# Patient Record
Sex: MALE | Race: ASIAN | NOT HISPANIC OR LATINO
[De-identification: names, ages, dates, MRNs, and addresses within clinical notes are randomized per-mention and may not be internally consistent; named-entity substitution may affect disease eponyms.]

---

## 2022-10-29 ENCOUNTER — NON-APPOINTMENT (OUTPATIENT)
Age: 42
End: 2022-10-29

## 2022-11-14 ENCOUNTER — APPOINTMENT (OUTPATIENT)
Dept: OTOLARYNGOLOGY | Facility: CLINIC | Age: 42
End: 2022-11-14

## 2022-11-14 VITALS
DIASTOLIC BLOOD PRESSURE: 82 MMHG | WEIGHT: 114.64 LBS | BODY MASS INDEX: 18.42 KG/M2 | HEIGHT: 66.14 IN | HEART RATE: 61 BPM | SYSTOLIC BLOOD PRESSURE: 124 MMHG

## 2022-11-14 DIAGNOSIS — H93.293 OTHER ABNORMAL AUDITORY PERCEPTIONS, BILATERAL: ICD-10-CM

## 2022-11-14 DIAGNOSIS — F17.210 NICOTINE DEPENDENCE, CIGARETTES, UNCOMPLICATED: ICD-10-CM

## 2022-11-14 PROBLEM — Z00.00 ENCOUNTER FOR PREVENTIVE HEALTH EXAMINATION: Status: ACTIVE | Noted: 2022-11-14

## 2022-11-14 PROCEDURE — 99203 OFFICE O/P NEW LOW 30 MIN: CPT

## 2022-11-14 RX ORDER — MUPIROCIN 20 MG/G
2 OINTMENT TOPICAL
Qty: 22 | Refills: 0 | Status: DISCONTINUED | COMMUNITY
Start: 2022-09-27

## 2022-11-20 PROBLEM — H93.293 OTHER ABNORMAL AUDITORY PERCEPTIONS, BILATERAL: Status: ACTIVE | Noted: 2022-11-20

## 2022-11-20 NOTE — PHYSICAL EXAM
[Binocular Microscopic Exam] : Binocular microscopic exam was performed [Normal] : mucosa is normal [Midline] : trachea located in midline position [Hearing Loss Right Only] : diminished [Hearing Loss Left Only] : diminished [de-identified] : 60% TM perforation

## 2022-11-20 NOTE — HISTORY OF PRESENT ILLNESS
[de-identified] : 42 year old male presents for initial evaluation for right TM perforation since he was kid\par Patient states over the past several years he believes it is getting worse because he has increase sensitivity to loud noises and cold air.\par Reports decreased hearing in the right and constant right tinnitus. \par Denies otalgia, otorrhea, recent fevers or ear infections, dizziness, vertigo, headaches related to hearing. \par

## 2022-11-20 NOTE — DATA REVIEWED
[de-identified] : An audiogram was ordered and performed including pure tones, tympanometry and speech testing for the patients complaint of hearing loss\par I have independently reviewed the patient's audiogram from today and my findings include 
previous myomectomy

## 2022-11-20 NOTE — ASSESSMENT
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 Physical exam shows Right 60% TM perforation, Normal EAC. Left Normal EAC/TM\par \par Recommend:\par SNHL\par -Discussed Benefit of Hearings Aids and their value of helping keep brain stimulated by helping hear conversation which keeps a person active and socially connected. Stressed also the association with a lower risk of incident dementia and slower cognitive decline.\par -Clearance Hearing Aid Evaluation Given\par -Patient interested in hearing aids after TM repair\par -Patient had recent audiogram on 10/25/22 instructed to bring that in when he see Dr. Damon\par \par Perforated TM\par - Educated patient on keeping ears as dry as possible, Avoid sticking anything into the ear canal (i.e. q-tips, hairpins to clean ears), Avoid swimming in polluted hines, To keep ears dry patient can use hair dryer to blow warm air back and forth over the ear canal.\par - Will allow for TM chance to naturally heal\par -Discussed that the TM can take up to 6 months to completely heal depending on the size and location. If TM does not heal naturally there are surgical options for repair\par -Referral to Dr. Christiano Damon for TM repair

## 2022-11-20 NOTE — REASON FOR VISIT
[Initial Evaluation] : an initial evaluation for [Spouse] : spouse [Other: ______] : provided by GISELLE [FreeTextEntry2] : right TM perforation [Interpreters_IDNumber] : [id number] [Interpreters_FullName] : Shabnam [FreeTextEntry3] : Mandarin

## 2022-12-22 ENCOUNTER — APPOINTMENT (OUTPATIENT)
Dept: OTOLARYNGOLOGY | Facility: CLINIC | Age: 42
End: 2022-12-22

## 2022-12-22 PROCEDURE — 92567 TYMPANOMETRY: CPT

## 2022-12-22 PROCEDURE — 99215 OFFICE O/P EST HI 40 MIN: CPT | Mod: 57

## 2022-12-22 PROCEDURE — 92557 COMPREHENSIVE HEARING TEST: CPT

## 2022-12-22 NOTE — PHYSICAL EXAM
[Midline] : trachea located in midline position [Binocular Microscopic Exam] : Binocular microscopic exam was performed [Normal] : the left mastoid was normal [Hearing Loss Right Only] : diminished [Hearing Loss Left Only] : diminished [de-identified] : 60% TM perforation

## 2022-12-22 NOTE — HISTORY OF PRESENT ILLNESS
[de-identified] : 42 year old male referred by Dr. Mikayla Guerra for evaluation of right TM perf. \par States the perforation occurred when he was a child. \par Patient believes may be worsening due to increased ear sensitivity. \par Reports decreased hearing in the right and constant right tinnitus. \par Denies otalgia, otorrhea, ear infections, dizziness, vertigo, headaches related to hearing. \par

## 2022-12-22 NOTE — DATA REVIEWED
[de-identified] : An audiogram was ordered and performed including tympanometry, pure tones and speech, for patient's complaint of hearing loss R ear\par I have independently reviewed the patient's audiogram from today and my findings include jaime SNHL and R CHL with large volume

## 2022-12-22 NOTE — PROCEDURE
[] : Binocular Microscopy [FreeTextEntry4] : none [FreeTextEntry6] : Rigid endoscope with video feedback was used to examine the ear canal, ear drum and visible middle ear landmarks & educate patient. Adequate exam/patient education would not have been possible without the use of an endoscope. Findings are described. Stills taken.\par \par

## 2022-12-22 NOTE — REASON FOR VISIT
[Initial Consultation] : an initial consultation for [Spouse] : spouse [Other: ______] : provided by GISELLE [FreeTextEntry2] : right TM perforation [Interpreters_IDNumber] : [id number] [Interpreters_FullName] : Demetria [FreeTextEntry3] : Mandarin  [TWNoteComboBox1] : Chinese

## 2023-01-12 ENCOUNTER — OUTPATIENT (OUTPATIENT)
Dept: OUTPATIENT SERVICES | Facility: HOSPITAL | Age: 43
LOS: 1 days | End: 2023-01-12

## 2023-01-12 VITALS
HEIGHT: 66 IN | SYSTOLIC BLOOD PRESSURE: 121 MMHG | OXYGEN SATURATION: 97 % | HEART RATE: 65 BPM | RESPIRATION RATE: 15 BRPM | DIASTOLIC BLOOD PRESSURE: 81 MMHG | TEMPERATURE: 98 F | WEIGHT: 119.93 LBS

## 2023-01-12 DIAGNOSIS — R09.89 OTHER SPECIFIED SYMPTOMS AND SIGNS INVOLVING THE CIRCULATORY AND RESPIRATORY SYSTEMS: ICD-10-CM

## 2023-01-12 DIAGNOSIS — H72.91 UNSPECIFIED PERFORATION OF TYMPANIC MEMBRANE, RIGHT EAR: ICD-10-CM

## 2023-01-12 DIAGNOSIS — Z78.9 OTHER SPECIFIED HEALTH STATUS: Chronic | ICD-10-CM

## 2023-01-12 DIAGNOSIS — Z86.69 PERSONAL HISTORY OF OTHER DISEASES OF THE NERVOUS SYSTEM AND SENSE ORGANS: ICD-10-CM

## 2023-01-12 DIAGNOSIS — K08.89 OTHER SPECIFIED DISORDERS OF TEETH AND SUPPORTING STRUCTURES: ICD-10-CM

## 2023-01-12 LAB
ANION GAP SERPL CALC-SCNC: 10 MMOL/L — SIGNIFICANT CHANGE UP (ref 7–14)
BUN SERPL-MCNC: 13 MG/DL — SIGNIFICANT CHANGE UP (ref 7–23)
CALCIUM SERPL-MCNC: 9.1 MG/DL — SIGNIFICANT CHANGE UP (ref 8.4–10.5)
CHLORIDE SERPL-SCNC: 103 MMOL/L — SIGNIFICANT CHANGE UP (ref 98–107)
CO2 SERPL-SCNC: 25 MMOL/L — SIGNIFICANT CHANGE UP (ref 22–31)
CREAT SERPL-MCNC: 0.66 MG/DL — SIGNIFICANT CHANGE UP (ref 0.5–1.3)
EGFR: 120 ML/MIN/1.73M2 — SIGNIFICANT CHANGE UP
GLUCOSE SERPL-MCNC: 96 MG/DL — SIGNIFICANT CHANGE UP (ref 70–99)
HCT VFR BLD CALC: 48.1 % — SIGNIFICANT CHANGE UP (ref 39–50)
HGB BLD-MCNC: 16.2 G/DL — SIGNIFICANT CHANGE UP (ref 13–17)
MCHC RBC-ENTMCNC: 31.1 PG — SIGNIFICANT CHANGE UP (ref 27–34)
MCHC RBC-ENTMCNC: 33.7 GM/DL — SIGNIFICANT CHANGE UP (ref 32–36)
MCV RBC AUTO: 92.3 FL — SIGNIFICANT CHANGE UP (ref 80–100)
NRBC # BLD: 0 /100 WBCS — SIGNIFICANT CHANGE UP (ref 0–0)
NRBC # FLD: 0 K/UL — SIGNIFICANT CHANGE UP (ref 0–0)
PLATELET # BLD AUTO: 259 K/UL — SIGNIFICANT CHANGE UP (ref 150–400)
POTASSIUM SERPL-MCNC: 4.1 MMOL/L — SIGNIFICANT CHANGE UP (ref 3.5–5.3)
POTASSIUM SERPL-SCNC: 4.1 MMOL/L — SIGNIFICANT CHANGE UP (ref 3.5–5.3)
RBC # BLD: 5.21 M/UL — SIGNIFICANT CHANGE UP (ref 4.2–5.8)
RBC # FLD: 12.7 % — SIGNIFICANT CHANGE UP (ref 10.3–14.5)
SODIUM SERPL-SCNC: 138 MMOL/L — SIGNIFICANT CHANGE UP (ref 135–145)
WBC # BLD: 7.54 K/UL — SIGNIFICANT CHANGE UP (ref 3.8–10.5)
WBC # FLD AUTO: 7.54 K/UL — SIGNIFICANT CHANGE UP (ref 3.8–10.5)

## 2023-01-12 NOTE — H&P PST ADULT - PRO TOBACCO QUIT READY
PAST MEDICAL HISTORY:  Atrial fibrillation     Diabetes mellitus     Hypothyroidism     Mild Alzheimer's dementia      refuses to discuss

## 2023-01-12 NOTE — H&P PST ADULT - ENMT COMMENTS
pt reports he has tympanic membrane perforation from childhood, now with ringing in right ear pre op diagnosis Right TM perforation/ CHL right ear/ FROM of neck.

## 2023-01-12 NOTE — H&P PST ADULT - WEIGHT IN KG
Detail Level: Detailed
Introduction Text (Please End With A Colon): The following procedure was deferred:
54.4

## 2023-01-12 NOTE — H&P PST ADULT - PROBLEM SELECTOR PLAN 1
Patient is tentatively scheduled for Right Tympanoplasty, composite cartilage graft with Christiano Scherer on 01/20/2023.    Pre-op instructions provided. Pt given verbal and written instructions with teach back on pepcid. Pt verbalized understanding with return demonstration.    Routine Covid PCR test ordered .Instructions regarding covid PCR test and locations for covid testing site provided. Pt verbalized understanding  CBC, BMP sent Patient is tentatively scheduled for Right Tympanoplasty, composite cartilage graft with Christiano Scherer on 01/20/2023.    Pre-op instructions provided. Pt given verbal and written instructions with teach back on Pepcid. Pt verbalized understanding with return demonstration.    Routine Covid PCR test ordered .Instructions regarding covid PCR test and locations for covid testing site provided. Pt verbalized understanding  CBC, BMP sent Patient has 2 loose teeth on  left lower molar area- patient doesn't have dentist- [facility] dental clinic information given- [phone].   emailed surgeon and copy in chart.

## 2023-01-12 NOTE — H&P PST ADULT - HISTORY OF PRESENT ILLNESS
42 year old with no pertinent PMH, presents to [facility], with pre op diagnosis of  right Tympanic membrane perforation, for pre op evaluation prior to scheduled surgery- Right Tympanoplasty, composite cartilage graft with Christiano Scherer.

## 2023-01-12 NOTE — H&P PST ADULT - ASSESSMENT
42 year old with no pertinent PMH, presents to [facility], with pre op diagnosis of  right Tympanic membrane perforation, for pre op evaluation prior to scheduled surgery- Right Tympanoplasty, composite cartilage graft with Christiano Scherer.

## 2023-01-12 NOTE — H&P PST ADULT - PROBLEM SELECTOR PLAN 2
complete visualization of the soft palate- Mallampati class1  denies loose teeth or dentures Patient is tentatively scheduled for Right Tympanoplasty, composite cartilage graft with Christiano Scherer on 01/20/2023.    Pre-op instructions provided. Pt given verbal and written instructions with teach back on Pepcid. Pt verbalized understanding with return demonstration.    Routine Covid PCR test ordered .Instructions regarding covid PCR test and locations for covid testing site provided. Pt verbalized understanding  CBC, BMP sent complete visualization of the soft palate- Mallampati class1.

## 2023-01-12 NOTE — H&P PST ADULT - PROBLEM SELECTOR PLAN 3
complete visualization of the soft palate- Mallampati class1  denies loose teeth or dentures Patient has 2 loose teeth on  left lower molar area- patient doesn't have dentist- [facility] dental clinic information given- [phone].   emailed surgeon and copy in chart.

## 2023-01-19 ENCOUNTER — TRANSCRIPTION ENCOUNTER (OUTPATIENT)
Age: 43
End: 2023-01-19

## 2023-01-19 VITALS
OXYGEN SATURATION: 98 % | HEIGHT: 66 IN | SYSTOLIC BLOOD PRESSURE: 125 MMHG | RESPIRATION RATE: 20 BRPM | DIASTOLIC BLOOD PRESSURE: 78 MMHG | WEIGHT: 119.93 LBS | HEART RATE: 55 BPM | TEMPERATURE: 98 F

## 2023-01-19 NOTE — ASU PREOPERATIVE ASSESSMENT, ADULT (IPARK ONLY) - FALL HARM RISK - UNIVERSAL INTERVENTIONS
Bed in lowest position, wheels locked, appropriate side rails in place/Call bell, personal items and telephone in reach/Instruct patient to call for assistance before getting out of bed or chair/Non-slip footwear when patient is out of bed/Bunch to call system/Physically safe environment - no spills, clutter or unnecessary equipment/Purposeful Proactive Rounding/Room/bathroom lighting operational, light cord in reach

## 2023-01-20 ENCOUNTER — RESULT REVIEW (OUTPATIENT)
Age: 43
End: 2023-01-20

## 2023-01-20 ENCOUNTER — TRANSCRIPTION ENCOUNTER (OUTPATIENT)
Age: 43
End: 2023-01-20

## 2023-01-20 ENCOUNTER — OUTPATIENT (OUTPATIENT)
Dept: OUTPATIENT SERVICES | Facility: HOSPITAL | Age: 43
LOS: 1 days | Discharge: HOME | End: 2023-01-20
Payer: MEDICAID

## 2023-01-20 ENCOUNTER — APPOINTMENT (OUTPATIENT)
Dept: OTOLARYNGOLOGY | Facility: AMBULATORY SURGERY CENTER | Age: 43
End: 2023-01-20

## 2023-01-20 VITALS
OXYGEN SATURATION: 98 % | RESPIRATION RATE: 14 BRPM | TEMPERATURE: 97 F | SYSTOLIC BLOOD PRESSURE: 112 MMHG | DIASTOLIC BLOOD PRESSURE: 74 MMHG | HEART RATE: 77 BPM

## 2023-01-20 DIAGNOSIS — Z78.9 OTHER SPECIFIED HEALTH STATUS: Chronic | ICD-10-CM

## 2023-01-20 DIAGNOSIS — H72.91 UNSPECIFIED PERFORATION OF TYMPANIC MEMBRANE, RIGHT EAR: ICD-10-CM

## 2023-01-20 PROCEDURE — 92504 EAR MICROSCOPY EXAMINATION: CPT

## 2023-01-20 PROCEDURE — 88304 TISSUE EXAM BY PATHOLOGIST: CPT | Mod: 26

## 2023-01-20 PROCEDURE — 15760 COMPOSITE SKIN GRAFT: CPT

## 2023-01-20 PROCEDURE — 69633 REBUILD EARDRUM STRUCTURES: CPT | Mod: RT

## 2023-01-20 DEVICE — TTP VARIAC SYSTEM MIDDLE EAR IMPLANT 1.75 X 4.50MM: Type: IMPLANTABLE DEVICE | Status: FUNCTIONAL

## 2023-01-20 DEVICE — SURGIFOAM PAD 8CM X 12.5CM X 2MM (100C): Type: IMPLANTABLE DEVICE | Status: FUNCTIONAL

## 2023-01-20 RX ORDER — ACETAMINOPHEN 500 MG
2 TABLET ORAL
Qty: 42 | Refills: 0
Start: 2023-01-20 | End: 2023-01-26

## 2023-01-20 RX ORDER — IBUPROFEN 200 MG
1 TABLET ORAL
Qty: 21 | Refills: 0
Start: 2023-01-20 | End: 2023-01-26

## 2023-01-20 NOTE — BRIEF OPERATIVE NOTE - NSICDXBRIEFPROCEDURE_GEN_ALL_CORE_FT
PROCEDURES:  Tympanoplasty with ossiculoplasty of right ear 20-Jan-2023 14:31:10  Christiano Damon  Composite skin grafting 20-Jan-2023 14:31:18  Christiano Damon

## 2023-01-20 NOTE — BRIEF OPERATIVE NOTE - OPERATION/FINDINGS
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

## 2023-01-20 NOTE — BRIEF OPERATIVE NOTE - NSICDXBRIEFPREOP_GEN_ALL_CORE_FT
PRE-OP DIAGNOSIS:  Tympanic membrane perforation, right 20-Jan-2023 14:31:31  Christiano Damon  Conductive hearing loss of right ear with restricted hearing of left ear 20-Jan-2023 14:31:59  Christiano Damon

## 2023-01-20 NOTE — BRIEF OPERATIVE NOTE - NSICDXBRIEFPOSTOP_GEN_ALL_CORE_FT
POST-OP DIAGNOSIS:  Eardrum rupture, right 20-Jan-2023 14:32:10  Christiano Damon  Conductive hearing loss of right ear with restricted hearing of left ear 20-Jan-2023 14:32:22  Christiano Damon

## 2023-01-26 LAB — SURGICAL PATHOLOGY STUDY: SIGNIFICANT CHANGE UP

## 2023-02-01 PROBLEM — Z86.69 PERSONAL HISTORY OF OTHER DISEASES OF THE NERVOUS SYSTEM AND SENSE ORGANS: Chronic | Status: ACTIVE | Noted: 2023-01-12

## 2023-02-02 ENCOUNTER — APPOINTMENT (OUTPATIENT)
Dept: OTOLARYNGOLOGY | Facility: CLINIC | Age: 43
End: 2023-02-02
Payer: MEDICAID

## 2023-02-02 PROCEDURE — 99024 POSTOP FOLLOW-UP VISIT: CPT

## 2023-02-02 NOTE — REASON FOR VISIT
[Spouse] : spouse [[employer] ] : provided by [employer]   [Source: ______] : History obtained from [unfilled] [de-identified] : Right tympanoplasty with composite cartilage graft, microscope use, and ossiculoplasty with a partial prosthesis. [de-identified] : 01/20/23 [de-identified] : 13 [de-identified] : 42 year male presents for post-op visit. States doing well overall. Pt reports mild right otalgia, taking tylenol with relief.  [Interpreters_IDNumber] : [id number] [Interpreters_FullName] : Av [FreeTextEntry3] : Mandarin [TWNoteComboBox1] : Chinese

## 2023-03-28 ENCOUNTER — APPOINTMENT (OUTPATIENT)
Dept: OTOLARYNGOLOGY | Facility: CLINIC | Age: 43
End: 2023-03-28
Payer: MEDICAID

## 2023-03-28 PROCEDURE — 99024 POSTOP FOLLOW-UP VISIT: CPT

## 2023-03-28 PROCEDURE — 92557 COMPREHENSIVE HEARING TEST: CPT

## 2023-03-28 PROCEDURE — 92567 TYMPANOMETRY: CPT

## 2023-03-28 NOTE — ASSESSMENT
[FreeTextEntry1] : dry cerumen, cleared\par superior small perf just anterior to malleus process\par small amt keratin at TT tendon\par cartilage/prosthesis in place\par f/u 3 mos\par An audiogram was ordered and performed including tympanometry, pure tones and speech, for patient's complaint of R TM perf, postop state\par I have independently reviewed the patient's audiogram from today and my findings include improved ABG on R with SRT down to 40dB from 60dB\par

## 2023-03-28 NOTE — REASON FOR VISIT
[Spouse] : spouse [[employer] ] : provided by [employer]   [Source: ______] : History obtained from [unfilled] [de-identified] : Right tympanoplasty with composite cartilage graft, microscope use, and ossiculoplasty with a partial prosthesis. [de-identified] : 01/20/23 [de-identified] : 42 year old man presents for post op follow-up \par Reports doing well since last clinic visit.  [Interpreters_IDNumber] : [id number] [Interpreters_FullName] : Av [FreeTextEntry3] : Mandarin [TWNoteComboBox1] : Chinese

## 2023-06-27 ENCOUNTER — APPOINTMENT (OUTPATIENT)
Dept: OTOLARYNGOLOGY | Facility: CLINIC | Age: 43
End: 2023-06-27
Payer: MEDICAID

## 2023-06-27 VITALS
HEIGHT: 66 IN | WEIGHT: 115 LBS | DIASTOLIC BLOOD PRESSURE: 82 MMHG | SYSTOLIC BLOOD PRESSURE: 126 MMHG | BODY MASS INDEX: 18.48 KG/M2 | HEART RATE: 54 BPM

## 2023-06-27 DIAGNOSIS — H90.11 CONDUCTIVE HEARING LOSS, UNILATERAL, RIGHT EAR, WITH UNRESTRICTED HEARING ON THE CONTRALATERAL SIDE: ICD-10-CM

## 2023-06-27 DIAGNOSIS — H72.91 UNSPECIFIED PERFORATION OF TYMPANIC MEMBRANE, RIGHT EAR: ICD-10-CM

## 2023-06-27 DIAGNOSIS — H71.91 UNSPECIFIED CHOLESTEATOMA, RIGHT EAR: ICD-10-CM

## 2023-06-27 PROCEDURE — 92504 EAR MICROSCOPY EXAMINATION: CPT

## 2023-06-27 PROCEDURE — 99213 OFFICE O/P EST LOW 20 MIN: CPT

## 2023-06-27 NOTE — PROCEDURE
[Same] : same as the Pre Op Dx. [] : Binocular Microscopy [FreeTextEntry1] : Right conductive hearing loss with tympanic membrane perforation.\par  [FreeTextEntry4] : none [FreeTextEntry6] : Operative microscope was used to examine the ear canal, ear drum and visible middle ear landmarks. Adequate exam would not have been possible without the use of a microscope. Findings are described.

## 2023-06-27 NOTE — PHYSICAL EXAM
[Binocular Microscopic Exam] : Binocular microscopic exam was performed [Hearing Loss Right Only] : diminished [Hearing Loss Left Only] : diminished [Rinne Test Air Conduction Persists > Bone Conduction Right] : air conduction greater than bone conduction on the right [Rinne Test Air Conduction Persists > Bone Conduction Left] : air conduction greater than bone conduction on the left [Hearing Ward Test (Tuning Fork On Forehead)] : no lateralization of tone [Midline] : trachea located in midline position [Normal] : no rashes [Nystagmus] : ~T no ~M nystagmus was seen [Fukuda Step Test] : Fukuda Step Test was Negative [Romberg's Sign] : Romberg's sign was absent [Fistula Sign] : Fistula Sign: Negative [Past-Pointing] : Past-Pointing: Negative [Rogelio-Hallcynthiake] : [place]-Hallpike: Negative [de-identified] : small perforation posterior to neck of malleus, no cholesteatoma and intact cartilage graft

## 2023-06-27 NOTE — HISTORY OF PRESENT ILLNESS
[de-identified] : 43 year old man presents for follow-up s/p Right tympanoplasty with composite cartilage graft, microscope use, and ossiculoplasty with a partial prosthesis on 01/20/2022\par Reports hearing has improved since the surgery.\par Denies otalgia, otorrhea, tinnitus, recent fevers or infections, dizziness, vertigo, headaches related to hearing. \par \par

## 2023-06-27 NOTE — REASON FOR VISIT
[Subsequent Evaluation] : a subsequent evaluation for [Spouse] : spouse [Other: ______] : provided by GISELLE [FreeTextEntry2] : s/p Right tympanoplasty with composite cartilage graft, microscope use, and ossiculoplasty with a partial prosthesis. [Interpreters_IDNumber] : [id number] [Interpreters_FullName] : Melinda [FreeTextEntry3] : Mandarin [TWNoteComboBox1] : Other

## (undated) DEVICE — BLADE TYMPANOPLASTY 2.5MM W 60 DEGREE BEVEL DOWN

## (undated) DEVICE — COTTONBALL LG

## (undated) DEVICE — LABELS BLANK W PEN

## (undated) DEVICE — ELCTR GROUNDING PAD ADULT COVIDIEN

## (undated) DEVICE — NDL HYPO SAFE 25G X 5/8" (ORANGE)

## (undated) DEVICE — BUR MEDTRONIC ENT ULTRA ROUND CUTTING LONG 3 X 72MM

## (undated) DEVICE — VENODYNE/SCD SLEEVE CALF MEDIUM

## (undated) DEVICE — BEAVER BLADE MINI (ORANGE)

## (undated) DEVICE — BEAVER BLADE MINI SHARP ALL ROUND (BLUE)

## (undated) DEVICE — PREP BETADINE SPONGE STICKS

## (undated) DEVICE — BLADE SICKLE EDGE STRAIGHT 84MM

## (undated) DEVICE — DRAPE TOWEL BLUE 17" X 24"

## (undated) DEVICE — DRSG MASTISOL

## (undated) DEVICE — BUR MEDTRONIC ENT VISAO ROUND FINE DIAMOND LONG 4.0MM X 72MM

## (undated) DEVICE — SUT MONOCRYL 4-0 18" P-3 UNDYED

## (undated) DEVICE — DRSG STERISTRIPS 0.25 X 4"

## (undated) DEVICE — PACK NASAL

## (undated) DEVICE — DRSG TELFA 3 X 8

## (undated) DEVICE — SOL IRR POUR NS 0.9% 500ML

## (undated) DEVICE — POSITIONER STRAP ARMBOARD VELCRO TS-30

## (undated) DEVICE — BUR MEDTRONIC ENT VISAO ROUND FINE DIAMOND X- LONG 2.0MM X 78MM

## (undated) DEVICE — BUR MEDTRONIC ENT VISAO ROUND FINE DIAMOND LONG 3.0MM X 72MM

## (undated) DEVICE — BUR MEDTRONIC ENT VISAO ROUND FINE DIAMOND LONG 1.0MM X 72MM

## (undated) DEVICE — WARMING BLANKET LOWER ADULT

## (undated) DEVICE — BUR MEDTRONIC ENT ULTRA ROUND CUTTING LONG FLUTED 6.0 X 69MM

## (undated) DEVICE — CANISTER DISPOSABLE THIN WALL 3000CC

## (undated) DEVICE — TUBING IRRIGATION

## (undated) DEVICE — DRAPE SPLIT SHEET 77" X 120"

## (undated) DEVICE — SOL IRR POUR H2O 1500ML

## (undated) DEVICE — SUT VICRYL 3-0 27" RB-1 UNDYED

## (undated) DEVICE — TUBING COOLANT

## (undated) DEVICE — DRSG TAPE UMBILICAL COTTON 2" X 30 X 1/8"